# Patient Record
Sex: FEMALE | URBAN - METROPOLITAN AREA
[De-identification: names, ages, dates, MRNs, and addresses within clinical notes are randomized per-mention and may not be internally consistent; named-entity substitution may affect disease eponyms.]

---

## 2020-05-22 ENCOUNTER — IMPORTED ENCOUNTER (OUTPATIENT)
Dept: URBAN - METROPOLITAN AREA CLINIC 1 | Facility: CLINIC | Age: 30
End: 2020-05-22

## 2020-05-22 PROBLEM — H52.13: Noted: 2020-05-22

## 2020-05-22 PROBLEM — H52.223: Noted: 2020-05-22

## 2020-05-22 PROCEDURE — S0620 ROUTINE OPHTHALMOLOGICAL EXA: HCPCS

## 2020-05-22 NOTE — PATIENT DISCUSSION
1. Myopia OU2. Astigmatism OURx was given for correction if indicated and requested. Trial CLs given. 3. Return for an appointment in 1 week for cc with Dr. Felice Archibald.

## 2020-05-29 ENCOUNTER — IMPORTED ENCOUNTER (OUTPATIENT)
Dept: URBAN - METROPOLITAN AREA CLINIC 1 | Facility: CLINIC | Age: 30
End: 2020-05-29

## 2022-04-02 ASSESSMENT — TONOMETRY
OS_IOP_MMHG: 16
OD_IOP_MMHG: 16

## 2022-04-02 ASSESSMENT — KERATOMETRY
OS_K2POWER_DIOPTERS: 45.50
OS_AXISANGLE2_DEGREES: 082
OD_AXISANGLE_DEGREES: 003
OS_AXISANGLE_DEGREES: 172
OD_AXISANGLE2_DEGREES: 093
OD_K2POWER_DIOPTERS: 45.50
OD_K1POWER_DIOPTERS: 44.25
OS_K1POWER_DIOPTERS: 43.50

## 2022-04-02 ASSESSMENT — VISUAL ACUITY
OD_SC: 20/20
OD_SC: J1+
OD_CC: 20/200
OD_CC: J1+
OS_CC: 20/250
OD_SC: 20/30
OS_CC: J1+
OS_SC: 20/20
OS_SC: J1+
OS_SC: 20/20